# Patient Record
Sex: FEMALE | Race: WHITE | NOT HISPANIC OR LATINO | Employment: FULL TIME | URBAN - METROPOLITAN AREA
[De-identification: names, ages, dates, MRNs, and addresses within clinical notes are randomized per-mention and may not be internally consistent; named-entity substitution may affect disease eponyms.]

---

## 2022-11-17 ENCOUNTER — OFFICE VISIT (OUTPATIENT)
Dept: URGENT CARE | Facility: CLINIC | Age: 32
End: 2022-11-17
Payer: COMMERCIAL

## 2022-11-17 VITALS
DIASTOLIC BLOOD PRESSURE: 91 MMHG | HEIGHT: 64 IN | BODY MASS INDEX: 29.02 KG/M2 | RESPIRATION RATE: 16 BRPM | HEART RATE: 88 BPM | WEIGHT: 170 LBS | SYSTOLIC BLOOD PRESSURE: 146 MMHG | OXYGEN SATURATION: 100 % | TEMPERATURE: 98 F

## 2022-11-17 DIAGNOSIS — J02.9 SORE THROAT: ICD-10-CM

## 2022-11-17 DIAGNOSIS — J06.9 VIRAL URI WITH COUGH: Primary | ICD-10-CM

## 2022-11-17 LAB
CTP QC/QA: YES
CTP QC/QA: YES
MOLECULAR STREP A: NEGATIVE
POC MOLECULAR INFLUENZA A AGN: NEGATIVE
POC MOLECULAR INFLUENZA B AGN: NEGATIVE

## 2022-11-17 PROCEDURE — 1159F PR MEDICATION LIST DOCUMENTED IN MEDICAL RECORD: ICD-10-PCS | Mod: CPTII,S$GLB,, | Performed by: PHYSICIAN ASSISTANT

## 2022-11-17 PROCEDURE — 87502 POCT INFLUENZA A/B MOLECULAR: ICD-10-PCS | Mod: QW,S$GLB,, | Performed by: PHYSICIAN ASSISTANT

## 2022-11-17 PROCEDURE — 1160F RVW MEDS BY RX/DR IN RCRD: CPT | Mod: CPTII,S$GLB,, | Performed by: PHYSICIAN ASSISTANT

## 2022-11-17 PROCEDURE — 3080F PR MOST RECENT DIASTOLIC BLOOD PRESSURE >= 90 MM HG: ICD-10-PCS | Mod: CPTII,S$GLB,, | Performed by: PHYSICIAN ASSISTANT

## 2022-11-17 PROCEDURE — 3077F PR MOST RECENT SYSTOLIC BLOOD PRESSURE >= 140 MM HG: ICD-10-PCS | Mod: CPTII,S$GLB,, | Performed by: PHYSICIAN ASSISTANT

## 2022-11-17 PROCEDURE — 3008F PR BODY MASS INDEX (BMI) DOCUMENTED: ICD-10-PCS | Mod: CPTII,S$GLB,, | Performed by: PHYSICIAN ASSISTANT

## 2022-11-17 PROCEDURE — 87502 INFLUENZA DNA AMP PROBE: CPT | Mod: QW,S$GLB,, | Performed by: PHYSICIAN ASSISTANT

## 2022-11-17 PROCEDURE — 1160F PR REVIEW ALL MEDS BY PRESCRIBER/CLIN PHARMACIST DOCUMENTED: ICD-10-PCS | Mod: CPTII,S$GLB,, | Performed by: PHYSICIAN ASSISTANT

## 2022-11-17 PROCEDURE — 87651 POCT STREP A MOLECULAR: ICD-10-PCS | Mod: QW,S$GLB,, | Performed by: PHYSICIAN ASSISTANT

## 2022-11-17 PROCEDURE — 3008F BODY MASS INDEX DOCD: CPT | Mod: CPTII,S$GLB,, | Performed by: PHYSICIAN ASSISTANT

## 2022-11-17 PROCEDURE — 1159F MED LIST DOCD IN RCRD: CPT | Mod: CPTII,S$GLB,, | Performed by: PHYSICIAN ASSISTANT

## 2022-11-17 PROCEDURE — 99203 PR OFFICE/OUTPT VISIT, NEW, LEVL III, 30-44 MIN: ICD-10-PCS | Mod: S$GLB,,, | Performed by: PHYSICIAN ASSISTANT

## 2022-11-17 PROCEDURE — 3077F SYST BP >= 140 MM HG: CPT | Mod: CPTII,S$GLB,, | Performed by: PHYSICIAN ASSISTANT

## 2022-11-17 PROCEDURE — 87651 STREP A DNA AMP PROBE: CPT | Mod: QW,S$GLB,, | Performed by: PHYSICIAN ASSISTANT

## 2022-11-17 PROCEDURE — 99203 OFFICE O/P NEW LOW 30 MIN: CPT | Mod: S$GLB,,, | Performed by: PHYSICIAN ASSISTANT

## 2022-11-17 PROCEDURE — 3080F DIAST BP >= 90 MM HG: CPT | Mod: CPTII,S$GLB,, | Performed by: PHYSICIAN ASSISTANT

## 2022-11-17 RX ORDER — BENZONATATE 200 MG/1
200 CAPSULE ORAL 3 TIMES DAILY PRN
Qty: 30 CAPSULE | Refills: 0 | Status: SHIPPED | OUTPATIENT
Start: 2022-11-17 | End: 2022-11-27

## 2022-11-17 RX ORDER — AZELASTINE 1 MG/ML
1 SPRAY, METERED NASAL 2 TIMES DAILY
Qty: 30 ML | Refills: 0 | Status: SHIPPED | OUTPATIENT
Start: 2022-11-17 | End: 2023-11-17

## 2022-11-17 RX ORDER — TRAZODONE HYDROCHLORIDE 100 MG/1
100 TABLET ORAL NIGHTLY
Status: ON HOLD | COMMUNITY
Start: 2022-11-16 | End: 2024-03-23 | Stop reason: HOSPADM

## 2022-11-17 RX ORDER — METHYLPREDNISOLONE 4 MG/1
TABLET ORAL
Qty: 1 EACH | Refills: 0 | Status: ON HOLD | OUTPATIENT
Start: 2022-11-17 | End: 2024-03-23 | Stop reason: HOSPADM

## 2022-11-17 RX ORDER — BUPROPION HYDROCHLORIDE 300 MG/1
300 TABLET ORAL DAILY
Status: ON HOLD | COMMUNITY
Start: 2022-11-07 | End: 2024-03-23 | Stop reason: HOSPADM

## 2022-11-18 NOTE — PROGRESS NOTES
"Subjective:       Patient ID: Davina Gao is a 32 y.o. female.    Vitals:  height is 5' 4" (1.626 m) and weight is 77.1 kg (170 lb). Her temperature is 98.2 °F (36.8 °C). Her blood pressure is 146/91 (abnormal) and her pulse is 88. Her respiration is 16 and oxygen saturation is 100%.     Chief Complaint: Nasal Congestion    Patient presents to Urgent Care today for sinus congestion and cough x's 1 day. Patient is currently taking Acetaminophen and NSAIDs for symptoms with no relief. Patient reports a scratchy sensation in the back of throat, postnasal drainage, headaches and muscle soreness.     URI   This is a new problem. The current episode started yesterday. The problem has been gradually worsening. There has been no fever. Associated symptoms include congestion, coughing, headaches and a sore throat. She has tried acetaminophen, decongestant and NSAIDs for the symptoms. The treatment provided no relief.     Constitution: Positive for chills and fever (subjective).   HENT:  Positive for congestion, postnasal drip, sinus pressure and sore throat.    Respiratory:  Positive for cough. Negative for shortness of breath.    Musculoskeletal:  Positive for back pain and muscle ache.   Neurological:  Positive for headaches.     Objective:      Physical Exam   Constitutional: She does not appear ill. No distress.   HENT:   Head: Normocephalic and atraumatic.   Ears:   Right Ear: Tympanic membrane, external ear and ear canal normal.   Left Ear: Tympanic membrane, external ear and ear canal normal.   Mouth/Throat: Mucous membranes are moist. No oropharyngeal exudate or posterior oropharyngeal erythema. Oropharynx is clear.   Eyes: Conjunctivae are normal. Right eye exhibits no discharge. Left eye exhibits no discharge. Extraocular movement intact   Neck: Neck supple.   Cardiovascular: Normal rate, regular rhythm and normal heart sounds.   No murmur heard.  Pulmonary/Chest: Effort normal and breath sounds normal. She has " no wheezes. She has no rhonchi. She has no rales.   Abdominal: Normal appearance.   Musculoskeletal: Normal range of motion.         General: Normal range of motion.   Lymphadenopathy:     She has no cervical adenopathy.   Neurological: no focal deficit. She is alert.   Skin: Skin is warm, dry and not pale. jaundice  Psychiatric: Her behavior is normal. Mood, judgment and thought content normal.   Nursing note and vitals reviewed.      Assessment:       1. Viral URI with cough    2. Sore throat          Plan:         Viral URI with cough    Sore throat  -     POCT Influenza A/B MOLECULAR  -     POCT Strep A, Molecular    Results for orders placed or performed in visit on 11/17/22   POCT Influenza A/B MOLECULAR   Result Value Ref Range    POC Molecular Influenza A Ag Negative Negative, Not Reported    POC Molecular Influenza B Ag Negative Negative, Not Reported     Acceptable Yes    POCT Strep A, Molecular   Result Value Ref Range    Molecular Strep A, POC Negative Negative     Acceptable Yes         Other orders  -     methylPREDNISolone (MEDROL DOSEPACK) 4 mg tablet; Take as directed on pack  Dispense: 1 each; Refill: 0  -     azelastine (ASTELIN) 137 mcg (0.1 %) nasal spray; 1 spray (137 mcg total) by Nasal route 2 (two) times daily.  Dispense: 30 mL; Refill: 0  -     benzonatate (TESSALON) 200 MG capsule; Take 1 capsule (200 mg total) by mouth 3 (three) times daily as needed for Cough.  Dispense: 30 capsule; Refill: 0       Cough, Runny Nose, and the Common Cold   The Basics   Written by the doctors and editors at [employer]   What causes cough, runny nose, and other symptoms of the common cold? -- These symptoms are usually caused by a viral infection. Lots of different viruses can take hold inside your nose, mouth, throat, or airways and cause cold symptoms.  Most people get over a cold without any lasting problems. Even so, having a cold can be uncomfortable. Also, some cold  symptoms can also be caused by other illnesses, such as coronavirus 2019 (COVID-19) or the flu.  What are the symptoms of the common cold? -- The symptoms include:  Sneezing  Coughing  Sniffling and runny nose  Sore throat  Chest congestion  In children, the common cold can also cause a fever. But adults do not usually get a fever when they have a cold. Some symptoms of the common cold can overlap with symptoms of COVID-19, although sneezing is uncommon in COVID-19.   When should I call the doctor or nurse? -- Contact your doctor or nurse if you live in an area where people have COVID-19. They will ask you questions about your symptoms and whether you might be at risk. They can tell you if you should get tested for the virus that causes COVID-19. If they think you are more likely to just have a cold, they might tell you to stay home and contact them again if your symptoms change or get worse.   You should also contact your doctor or nurse if you:  Lose your sense of taste or smell  Have a fever of more than 100.4º F (38º C) that comes with shaking chills, loss of appetite, or trouble breathing  Have a fever and also have lung disease, such as emphysema or asthma  Have a cough that lasts longer than 10 days  Have chest pain when you cough or breathe deeply, have trouble breathing, or cough up blood  If you are older than 65, or if you have any chronic medical conditions such as diabetes, you should contact your doctor or nurse any time you get a long-lasting cough.  Take your child to the emergency room if they:  Become confused or stop responding to you  Have trouble breathing or have to work hard to breathe  Contact your child's doctor or nurse if the child:  Refuses to drink anything for a long time  Is younger than 4 months  Has a fever and is not acting like themself  Has a cough that lasts for more than 2 weeks and is not getting any better  Has a stuffed or runny nose that gets worse or does not get any better  after 10 days  Has red eyes or yellow goop coming out of their eyes  Has ear pain, pulls at their ears, or shows other signs of having an ear infection  What can I do to feel better? -- If you are a teenager or an adult, you can try cough and cold medicines that you can get without a prescription. These medicines might help with your symptoms. But they won't cure your cold, or help you get well faster.  If you decide to try nonprescription cold medicines, be sure to follow the directions on the label. Do not combine 2 or more medicines that have acetaminophen in them. If you take too much acetaminophen, the drug can damage your liver. Also, if you have a heart condition, high blood pressure, or you take any prescription medicines, ask your pharmacist if it is safe to take the cold medicine you have in mind.  What should I know if my child has a cold? -- In children, the common cold is often more severe than it is in adults. It also lasts longer. Plus, children often get a fever during the first 3 days of a cold.  Are cough and cold medicines safe for children? -- If your child is younger than 6, you should not give them any cold medicines. These medicines are not safe for young children. Even if your child is older than 6, cough and cold medicines are unlikely to help.  Never give aspirin to any child younger than 18 years old. In children, aspirin can cause a life-threatening condition called Reye syndrome. When giving your child acetaminophen or other nonprescription medicines, never give more than the recommended dose.  How long will I be sick? -- Colds usually last 3 to 7 days in adults and 10 days in children, but some people have symptoms for up to 2 weeks.  Can the common cold lead to more serious problems? -- In some cases, yes. In some people having a cold can lead to:  Ear infections  Worsening of asthma symptoms  Sinus infections  Pneumonia or bronchitis (infections of the lungs)  How can I keep from  getting another cold? -- The most important thing you can do is to wash your hands often with soap and water. This can also prevent the spread of other illnesses like the flu and COVID-19. The table has instructions on how to wash your hands to prevent spreading illness (table 1).  The germs that cause the common cold can live on tables, door handles, and other surfaces for at least 2 hours. You never know when you might be touching germs. That's why it's so important to clean your hands often.  It's also important to stay away from other people when you are sick. This will help prevent the spread of illness.  All topics are updated as new evidence becomes available and our peer review process is complete.  This topic retrieved from [employer] on: Sep 21, 2021.  Topic 64455 Version 20.0  Release: 29.4.2 - C29.263  © 2021 Up[employer]Date, Inc[employer] and/or its affiliates. All rights reserved.  table 1: Hand washing to prevent spreading illness  Wet your hands and put soap on them    Rub your hands together for at least 20 seconds. Make sure to clean your wrists, fingernails, and in between your fingers.    Rinse your hands    Dry your hands with a paper towel that you can throw away    If you are not near a sink, you can use a hand gel to clean your hands. The gels with at least 60 percent alcohol work the best. But it is better to wash with soap and water if you can.  Graphic 667216 Version 3.0  Consumer Information Use and Disclaimer   This information is not specific medical advice and does not replace information you receive from your health care provider. This is only a brief summary of general information. It does NOT include all information about conditions, illnesses, injuries, tests, procedures, treatments, therapies, discharge instructions or life-style choices that may apply to you. You must talk with your health care provider for complete information about your health and treatment options. This information should not  be used to decide whether or not to accept your health care provider's advice, instructions or recommendations. Only your health care provider has the knowledge and training to provide advice that is right for you. The use of this information is governed by the [employer] End User License Agreement, available at [email or web address]/naila.The use of [employer] content is governed by the [employer] Terms of Use. ©2021 Up[employer]Date, Inc. All rights reserved.  Copyright   © 2021 Up[employer]Date, Inc. and/or its affiliates. All rights reserved.

## 2022-11-24 ENCOUNTER — CLINICAL SUPPORT (OUTPATIENT)
Dept: URGENT CARE | Facility: CLINIC | Age: 32
End: 2022-11-24
Payer: COMMERCIAL

## 2022-11-24 VITALS
HEART RATE: 107 BPM | TEMPERATURE: 98 F | DIASTOLIC BLOOD PRESSURE: 85 MMHG | HEIGHT: 64 IN | WEIGHT: 170 LBS | BODY MASS INDEX: 29.02 KG/M2 | RESPIRATION RATE: 16 BRPM | OXYGEN SATURATION: 100 % | SYSTOLIC BLOOD PRESSURE: 128 MMHG

## 2022-11-24 DIAGNOSIS — H65.91 RIGHT OTITIS MEDIA WITH EFFUSION: Primary | ICD-10-CM

## 2022-11-24 PROCEDURE — 96372 THER/PROPH/DIAG INJ SC/IM: CPT | Mod: S$GLB,,, | Performed by: PHYSICIAN ASSISTANT

## 2022-11-24 PROCEDURE — 99213 OFFICE O/P EST LOW 20 MIN: CPT | Mod: 25,S$GLB,, | Performed by: PHYSICIAN ASSISTANT

## 2022-11-24 PROCEDURE — 96372 PR INJECTION,THERAP/PROPH/DIAG2ST, IM OR SUBCUT: ICD-10-PCS | Mod: S$GLB,,, | Performed by: PHYSICIAN ASSISTANT

## 2022-11-24 PROCEDURE — 99213 PR OFFICE/OUTPT VISIT, EST, LEVL III, 20-29 MIN: ICD-10-PCS | Mod: 25,S$GLB,, | Performed by: PHYSICIAN ASSISTANT

## 2022-11-24 RX ORDER — AMOXICILLIN AND CLAVULANATE POTASSIUM 875; 125 MG/1; MG/1
1 TABLET, FILM COATED ORAL EVERY 12 HOURS
Qty: 20 TABLET | Refills: 0 | Status: SHIPPED | OUTPATIENT
Start: 2022-11-24 | End: 2022-12-04

## 2022-11-24 RX ORDER — FLUTICASONE PROPIONATE 50 MCG
1 SPRAY, SUSPENSION (ML) NASAL 2 TIMES DAILY
Qty: 16 ML | Refills: 3 | Status: SHIPPED | OUTPATIENT
Start: 2022-11-24

## 2022-11-24 RX ORDER — BETAMETHASONE SODIUM PHOSPHATE AND BETAMETHASONE ACETATE 3; 3 MG/ML; MG/ML
6 INJECTION, SUSPENSION INTRA-ARTICULAR; INTRALESIONAL; INTRAMUSCULAR; SOFT TISSUE
Status: COMPLETED | OUTPATIENT
Start: 2022-11-24 | End: 2022-11-24

## 2022-11-24 RX ORDER — AZELASTINE 1 MG/ML
1 SPRAY, METERED NASAL 2 TIMES DAILY PRN
Qty: 30 ML | Refills: 3 | Status: SHIPPED | OUTPATIENT
Start: 2022-11-24

## 2022-11-24 RX ADMIN — BETAMETHASONE SODIUM PHOSPHATE AND BETAMETHASONE ACETATE 6 MG: 3; 3 INJECTION, SUSPENSION INTRA-ARTICULAR; INTRALESIONAL; INTRAMUSCULAR; SOFT TISSUE at 12:11

## 2022-11-24 NOTE — PROGRESS NOTES
"Subjective:       Patient ID: Davina Gao is a 32 y.o. female.    Vitals:  height is 5' 4" (1.626 m) and weight is 77.1 kg (170 lb). Her temperature is 98.3 °F (36.8 °C). Her blood pressure is 128/85 and her pulse is 107. Her respiration is 16 and oxygen saturation is 100%.     Chief Complaint: Ear Problem (Entered by patient)    Otalgia   There is pain in the right ear. This is a new problem. The current episode started in the past 7 days. The problem occurs constantly. The problem has been unchanged. There has been no fever. The pain is at a severity of 7/10. The pain is moderate. Associated symptoms include rhinorrhea and a sore throat. Pertinent negatives include no abdominal pain, coughing, diarrhea, ear discharge, headaches, hearing loss, neck pain, rash or vomiting. Treatments tried: OTC Aleve and Nyquil. The treatment provided no relief.     Constitution: Negative for chills, sweating, fatigue and fever.   HENT:  Positive for ear pain, congestion, postnasal drip, sinus pressure and sore throat. Negative for ear discharge, foreign body in ear, tinnitus, hearing loss, drooling, nosebleeds, foreign body in nose, sinus pain, trouble swallowing and voice change.    Neck: Negative for neck pain, neck stiffness, painful lymph nodes and neck swelling.   Cardiovascular:  Negative for chest pain, leg swelling, palpitations, sob on exertion and passing out.   Eyes:  Negative for eye pain, eye redness, photophobia, double vision, blurred vision and eyelid swelling.   Respiratory:  Negative for chest tightness, cough, sputum production, bloody sputum, shortness of breath, stridor and wheezing.    Gastrointestinal:  Negative for abdominal pain, abdominal bloating, nausea, vomiting, constipation, diarrhea and heartburn.   Musculoskeletal:  Negative for joint pain, joint swelling, abnormal ROM of joint, back pain, muscle cramps and muscle ache.   Skin:  Negative for rash and hives.   Allergic/Immunologic: Negative for " seasonal allergies, food allergies, hives, itching and sneezing.   Neurological:  Negative for dizziness, light-headedness, passing out, loss of balance, headaches, altered mental status, loss of consciousness and seizures.   Hematologic/Lymphatic: Negative for swollen lymph nodes.   Psychiatric/Behavioral:  Negative for altered mental status and nervous/anxious. The patient is not nervous/anxious.      Objective:      Physical Exam   Constitutional: She is oriented to person, place, and time. She appears well-developed. She is cooperative.  Non-toxic appearance. She does not appear ill. No distress.   HENT:   Head: Normocephalic and atraumatic.   Ears:   Right Ear: Hearing, external ear and ear canal normal. No no drainage or cerumen not present. Tympanic membrane is injected, erythematous and bulging. A middle ear effusion is present.   Left Ear: Hearing, tympanic membrane, external ear and ear canal normal. No no drainage or cerumen not present. Tympanic membrane is not injected, not erythematous and not bulging.  No middle ear effusion.   Nose: Mucosal edema and rhinorrhea present. No nasal deformity. No epistaxis. Right sinus exhibits no maxillary sinus tenderness and no frontal sinus tenderness. Left sinus exhibits no maxillary sinus tenderness and no frontal sinus tenderness.   Mouth/Throat: Uvula is midline and mucous membranes are normal. No trismus in the jaw. Normal dentition. No uvula swelling. Posterior oropharyngeal erythema and cobblestoning present. No oropharyngeal exudate, posterior oropharyngeal edema or tonsillar abscesses. No tonsillar exudate.   Eyes: Conjunctivae and lids are normal. No scleral icterus.   Neck: Trachea normal and phonation normal. Neck supple. No edema present. No erythema present. No neck rigidity present.   Cardiovascular: Normal rate, regular rhythm, normal heart sounds and normal pulses.   Pulmonary/Chest: Effort normal and breath sounds normal. No accessory muscle usage  or stridor. No respiratory distress. She has no decreased breath sounds. She has no wheezes. She has no rhonchi. She has no rales.   Abdominal: Normal appearance.   Musculoskeletal: Normal range of motion.         General: No deformity. Normal range of motion.   Lymphadenopathy:     She has no cervical adenopathy.   Neurological: She is alert and oriented to person, place, and time. She exhibits normal muscle tone. Coordination normal.   Skin: Skin is warm, dry, intact, not diaphoretic, not pale and no rash. Capillary refill takes less than 2 seconds.   Psychiatric: Her speech is normal and behavior is normal. Judgment and thought content normal.   Nursing note and vitals reviewed.      Assessment:       1. Right otitis media with effusion          Plan:     Advised close follow-up with PCP and/or Specialist for further evaluation as needed. ER precautions given to patient as well. Patient aware, verbalized understanding and agreed with plan of care.    Right otitis media with effusion    Other orders  -     amoxicillin-clavulanate 875-125mg (AUGMENTIN) 875-125 mg per tablet; Take 1 tablet by mouth every 12 (twelve) hours. for 10 days  Dispense: 20 tablet; Refill: 0  -     fluticasone propionate (FLONASE ALLERGY RELIEF) 50 mcg/actuation nasal spray; 1 spray (50 mcg total) by Each Nostril route 2 (two) times daily.  Dispense: 16 mL; Refill: 3  -     azelastine (ASTELIN) 137 mcg (0.1 %) nasal spray; 1 spray (137 mcg total) by Nasal route 2 (two) times daily as needed for Rhinitis.  Dispense: 30 mL; Refill: 3  -     betamethasone acetate-betamethasone sodium phosphate injection 6 mg       There are no Patient Instructions on file for this visit.

## 2024-03-23 PROBLEM — Z34.90 ENCOUNTER FOR ELECTIVE INDUCTION OF LABOR: Status: ACTIVE | Noted: 2024-03-23
